# Patient Record
Sex: FEMALE | Race: WHITE | NOT HISPANIC OR LATINO | Employment: OTHER | URBAN - METROPOLITAN AREA
[De-identification: names, ages, dates, MRNs, and addresses within clinical notes are randomized per-mention and may not be internally consistent; named-entity substitution may affect disease eponyms.]

---

## 2017-02-22 ENCOUNTER — OFFICE VISIT (OUTPATIENT)
Dept: NEUROLOGY | Facility: CLINIC | Age: 82
End: 2017-02-22

## 2017-02-22 VITALS
WEIGHT: 142 LBS | SYSTOLIC BLOOD PRESSURE: 124 MMHG | HEIGHT: 66 IN | DIASTOLIC BLOOD PRESSURE: 70 MMHG | OXYGEN SATURATION: 94 % | BODY MASS INDEX: 22.82 KG/M2 | HEART RATE: 76 BPM

## 2017-02-22 DIAGNOSIS — R49.8 HYPOPHONIA: ICD-10-CM

## 2017-02-22 DIAGNOSIS — G20 PARKINSON'S DISEASE (HCC): ICD-10-CM

## 2017-02-22 DIAGNOSIS — R26.81 GAIT INSTABILITY: ICD-10-CM

## 2017-02-22 PROCEDURE — 99214 OFFICE O/P EST MOD 30 MIN: CPT | Performed by: PSYCHIATRY & NEUROLOGY

## 2017-02-22 RX ORDER — LEVOTHYROXINE SODIUM 88 UG/1
TABLET ORAL
COMMUNITY
Start: 2017-01-13

## 2017-02-22 RX ORDER — AMLODIPINE BESYLATE 5 MG/1
5 TABLET ORAL
COMMUNITY
Start: 2017-01-04

## 2017-02-22 RX ORDER — CLOPIDOGREL BISULFATE 75 MG/1
TABLET ORAL
COMMUNITY
Start: 2016-08-02

## 2017-02-22 RX ORDER — PRAMIPEXOLE DIHYDROCHLORIDE 0.25 MG/1
0.25 TABLET ORAL 3 TIMES DAILY
Qty: 90 TABLET | Refills: 4 | Status: SHIPPED | OUTPATIENT
Start: 2017-02-22 | End: 2017-03-24

## 2017-02-22 RX ORDER — FLUTICASONE PROPIONATE 50 MCG
2 SPRAY, SUSPENSION (ML) NASAL
COMMUNITY
Start: 2016-07-13

## 2017-02-22 RX ORDER — ERGOCALCIFEROL 1.25 MG/1
50000 CAPSULE ORAL
COMMUNITY
Start: 2016-06-08

## 2017-02-22 NOTE — PROGRESS NOTES
Subjective   Akosua Waite is a 81 y.o. female with history of Parkinson's disease with hypophonia and gait instability came for follow-up appointment.    History of Present Illness   She was accompanied by her son and daughter-in-law and according to them, her tremors got worse recently and having shuffling gait and balance issues when walking and had fallen once and using a walker and complaint with the medication neupro patch and denies any side effects.  Tried carbidopa/levodopa and had side effects and discontinued.  Got home physical and occupational and speech therapy and it helped her symptoms.  Will speech issues at the same and denies any worsening and denies any difficulty swallowing or difficulty breathing.  Her memory issues are the same since the last visit and denies any hallucinations.  Denies any headaches, blurred vision, double vision, focal weakness, tingling numbness, dizziness, passing out spells or recent hospitalizations since last visit.  Denies any major depression or anxiety issues.    The following portions of the patient's history were reviewed and updated as appropriate: allergies, current medications, past family history, past medical history, past social history, past surgical history and problem list.    Review of Systems   Constitutional: Positive for fatigue. Negative for chills and fever.   HENT: Positive for hearing loss and trouble swallowing. Negative for tinnitus.    Eyes: Positive for itching and visual disturbance (double). Negative for pain.   Respiratory: Positive for wheezing. Negative for shortness of breath.    Cardiovascular: Positive for leg swelling. Negative for chest pain and palpitations.   Gastrointestinal: Positive for constipation. Negative for diarrhea, nausea and vomiting.   Endocrine: Negative for cold intolerance and heat intolerance.   Genitourinary: Negative for decreased urine volume, difficulty urinating and urgency.   Musculoskeletal: Positive for  back pain (sometimes), gait problem (has a walker) and neck pain.   Skin: Negative for rash and wound.   Allergic/Immunologic: Negative for environmental allergies and food allergies.   Neurological: Positive for tremors and weakness. Negative for dizziness, numbness and headaches.   Psychiatric/Behavioral: Positive for hallucinations (dreams) and self-injury. Negative for confusion and sleep disturbance.       Objective   Physical Exam   Vitals reviewed.    GENERAL EXAMINATION : Patient is well-developed, well-nourished, well-groomed, alert and approriate in no apparent distress.  HEENT: Normocephalic, normal funduscopic exam, no visible oral lesions.  NECK : Normal range of motion, no tenderness, no malalignment.  CARDIAC : Regular rate and rhythm, no murmurs.  CHEST : Clear to auscultation, bilateral.   No wheezing .  ABDOMEN : Soft, non tender and non distended. EXTREMITIES: No edema. SKIN : No rashes or lesions visible. MUSCULOSKELETAL : No muscle weakness or muscle pain. No joint pains. PSYCHIATRIC : Awake and follwoing verbal commands well.     NEUROLOGICAL EXAMINATION  :  Higher integrative functions : No aphasia or dysarthria.  CRANIAL NERVES : Cranial nerve II: Normal visual acuity and visual fields.  On funduscopic exam, discs are flat with sharp margins cranial nerves III, IV, VI: Extraocular movements are full without nystagmus; pupils are equal, round and reactive to light.  Cranial nerve V: Normal facial sensation and strength of muscles of mastication.  Cranial nerve: VII: Facial movements are symmetric.  No weakness.  Cranial nerve VIII: Auditory acuity is normal.  Cranial nerve IX, X: Symmetric palate movement.  Cranial nerve XI sternocleidomastoid and trapezius are normal.  Cranial nerve XII: Tongue in the midline with no atrophy or fasciculations.      MOTOR : Normal muscle strength except for subtle resting tremor with mild cogwheel rigidity and decreased arm swing.  SENSATION : Normal to light  touch, pinprick, vibration sensations intact and Romberg is negative.  MUSCLE STRETCH REFLEXES : Normal and symmetric in the upper extremities and lower extremities and the plantar responses are flexor bilaterally. COORDINATION : Finger to nose test showed no dysmetria.  Rapid alternating movements are normal.   GAIT : Walks on heels, toes, except for difficulty with tandem walk.    Assessment/Plan   Akosua was seen today for parkinson's disease.    Diagnoses and all orders for this visit:    Parkinson's disease    Gait instability    Hypophonia    Other orders  -     pramipexole (MIRAPEX) 0.25 MG tablet; Take 1 tablet by mouth 3 (Three) Times a Day for 30 days.     81-year-old right-handed white female with history of Parkinson's disease with hypophonia and gait instability came for follow-up appointment.  On exam, no new focal deficits were seen except for subtle resting tremor with mild cogwheel rigidity and decreased arm swing with difficulty tandem walk.  Discussed with the patient and her family regarding her signs and symptoms and told her to continue neupro patch 8 mg/24 hours to apply daily and discussed about side effects.  Will start pramipexole 0.25 mg one tablet by mouth 3 times a day and discussed about side effects.  Discussed about fall precautions and told her to continue physical activity and do stationary bike and will consider physical therapy in future for worsening of balance issues.  Will consider memory evaluation in future for worsening of memory issues.  Discussed about brain exercises to help with the memory issues.  Will follow-up in 3 months or earlier if problems arise.    Thank you for allowing me to participate in the care of the patient.  Please feel free to call for any questions at your convenience.    Yours sincerely,    Melissa Villanueva M.D

## 2017-03-31 ENCOUNTER — TELEPHONE (OUTPATIENT)
Dept: NEUROLOGY | Facility: CLINIC | Age: 82
End: 2017-03-31

## 2017-03-31 NOTE — TELEPHONE ENCOUNTER
Called the patient's daughter and talked to her and told her to decrease the dose of pramipexole 0.25 mg by mouth twice a day and call me in one week.

## 2017-03-31 NOTE — TELEPHONE ENCOUNTER
----- Message from Belgica Marks sent at 3/29/2017 11:17 AM EDT -----  Contact: [phone]  PTS DAUGHTER CALLED ABOUT A MEDICATION PRAMIPEXOLE FOR TREMORS. IT'S MAKING PT REALLY SLEEPY, DAUGHTER WANTS TO KNOW IF  COULD LOWER THE DOSAGE.

## 2017-05-22 ENCOUNTER — OFFICE VISIT (OUTPATIENT)
Dept: NEUROLOGY | Facility: CLINIC | Age: 82
End: 2017-05-22

## 2017-05-22 VITALS
HEIGHT: 66 IN | BODY MASS INDEX: 22.5 KG/M2 | HEART RATE: 63 BPM | OXYGEN SATURATION: 98 % | SYSTOLIC BLOOD PRESSURE: 124 MMHG | WEIGHT: 140 LBS | DIASTOLIC BLOOD PRESSURE: 64 MMHG

## 2017-05-22 DIAGNOSIS — G20 PARKINSON'S DISEASE (HCC): ICD-10-CM

## 2017-05-22 DIAGNOSIS — R49.8 HYPOPHONIA: ICD-10-CM

## 2017-05-22 DIAGNOSIS — R26.81 GAIT INSTABILITY: ICD-10-CM

## 2017-05-22 PROCEDURE — 99214 OFFICE O/P EST MOD 30 MIN: CPT | Performed by: PSYCHIATRY & NEUROLOGY

## 2017-05-22 RX ORDER — PRAMIPEXOLE DIHYDROCHLORIDE 0.5 MG/1
0.5 TABLET ORAL 2 TIMES DAILY
Qty: 60 TABLET | Refills: 5 | Status: SHIPPED | OUTPATIENT
Start: 2017-05-22 | End: 2017-06-21

## 2017-05-22 RX ORDER — PRAMIPEXOLE DIHYDROCHLORIDE 0.25 MG/1
TABLET ORAL
Refills: 0 | COMMUNITY
Start: 2017-05-06 | End: 2017-05-22

## 2017-05-22 RX ORDER — ROTIGOTINE 8 MG/24H
PATCH, EXTENDED RELEASE TRANSDERMAL
Refills: 0 | COMMUNITY
Start: 2017-04-25

## 2017-05-26 ENCOUNTER — TELEPHONE (OUTPATIENT)
Dept: NEUROLOGY | Facility: CLINIC | Age: 82
End: 2017-05-26

## 2017-05-30 DIAGNOSIS — R49.8 HYPOPHONIA: ICD-10-CM

## 2017-05-30 DIAGNOSIS — G20 PARKINSON'S DISEASE (HCC): Primary | ICD-10-CM

## 2017-06-02 ENCOUNTER — TELEPHONE (OUTPATIENT)
Dept: NEUROLOGY | Facility: CLINIC | Age: 82
End: 2017-06-02

## 2017-06-02 NOTE — TELEPHONE ENCOUNTER
----- Message from Belgica Marks sent at 6/2/2017  2:07 PM EDT -----  Contact: [phone]  This message was sent to me from  scheduling department from [facility] :      Spoke to Arturo Murphy - she stated Alanna from [facility] came to evaluate and that S.T. was not needed at this time.  Pt. was functional and pt. stated she felt she was doing fine at this time.    Spoke to Alanna OLIVERA from [facility].  She stated that pt felt she was doing fine at this time and did not require S.T.  at this time.  I will re-direct this referral back to MD office.  Please let us know if we can be of service to pt. in the future.  Thanks.

## 2017-06-26 RX ORDER — ROTIGOTINE 8 MG/24H
PATCH, EXTENDED RELEASE TRANSDERMAL
Qty: 30 PATCH | Refills: 5 | Status: SHIPPED | OUTPATIENT
Start: 2017-06-26

## 2017-07-03 DIAGNOSIS — G20 PARKINSON'S DISEASE (HCC): Primary | ICD-10-CM

## 2017-07-06 ENCOUNTER — HOSPITAL ENCOUNTER (OUTPATIENT)
Dept: HOSPITAL 23 - CWCC | Age: 82
Discharge: HOME | End: 2017-07-06
Payer: COMMERCIAL

## 2017-07-06 DIAGNOSIS — M85.88: ICD-10-CM

## 2017-07-06 DIAGNOSIS — Z78.0: ICD-10-CM

## 2017-07-06 DIAGNOSIS — E55.9: ICD-10-CM

## 2017-07-06 DIAGNOSIS — Z13.820: Primary | ICD-10-CM

## 2017-07-06 DIAGNOSIS — M19.90: ICD-10-CM

## 2017-08-07 RX ORDER — ROTIGOTINE 8 MG/24H
PATCH, EXTENDED RELEASE TRANSDERMAL
Qty: 30 PATCH | Refills: 5 | Status: SHIPPED | OUTPATIENT
Start: 2017-08-07